# Patient Record
Sex: MALE | Employment: UNEMPLOYED | URBAN - METROPOLITAN AREA
[De-identification: names, ages, dates, MRNs, and addresses within clinical notes are randomized per-mention and may not be internally consistent; named-entity substitution may affect disease eponyms.]

---
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
## 2022-04-03 ENCOUNTER — HOSPITAL ENCOUNTER (EMERGENCY)
Facility: CLINIC | Age: 22
Discharge: HOME | End: 2022-04-03
Attending: EMERGENCY MEDICINE | Admitting: EMERGENCY MEDICINE
Payer: COMMERCIAL

## 2022-04-03 VITALS
OXYGEN SATURATION: 97 % | TEMPERATURE: 98.2 F | DIASTOLIC BLOOD PRESSURE: 72 MMHG | HEART RATE: 67 BPM | BODY MASS INDEX: 25.9 KG/M2 | SYSTOLIC BLOOD PRESSURE: 123 MMHG | WEIGHT: 185 LBS | HEIGHT: 71 IN | RESPIRATION RATE: 18 BRPM

## 2022-04-03 DIAGNOSIS — F32.A DEPRESSION, UNSPECIFIED DEPRESSION TYPE: ICD-10-CM

## 2022-04-03 DIAGNOSIS — R45.851 PASSIVE SUICIDAL IDEATIONS: Primary | ICD-10-CM

## 2022-04-03 LAB
AMPHETAMINES UR QL SCN: NORMAL
BARBITURATES UR QL: NORMAL
BENZODIAZ UR QL: NORMAL
CANNABINOIDS UR QL SCN: NORMAL
COCAINE UR QL: NORMAL
OPIATES UR QL SCN: NORMAL

## 2022-04-03 PROCEDURE — 99285 EMERGENCY DEPT VISIT HI MDM: CPT | Mod: 25 | Performed by: EMERGENCY MEDICINE

## 2022-04-03 PROCEDURE — 99285 EMERGENCY DEPT VISIT HI MDM: CPT | Performed by: EMERGENCY MEDICINE

## 2022-04-03 PROCEDURE — 80307 DRUG TEST PRSMV CHEM ANLYZR: CPT | Performed by: EMERGENCY MEDICINE

## 2022-04-03 PROCEDURE — 90791 PSYCH DIAGNOSTIC EVALUATION: CPT

## 2022-04-03 ASSESSMENT — ENCOUNTER SYMPTOMS
DYSPHORIC MOOD: 1
HYPERACTIVE: 0
COLOR CHANGE: 0
HEMATURIA: 0
DYSURIA: 0
FREQUENCY: 0
DIARRHEA: 0
APPETITE CHANGE: 0
NUMBNESS: 0
PALPITATIONS: 0
AGITATION: 0
ABDOMINAL PAIN: 0
WOUND: 0
NAUSEA: 0
COUGH: 0
CONSTIPATION: 0
SHORTNESS OF BREATH: 0
NECK PAIN: 0
WEAKNESS: 0
FEVER: 0
CHILLS: 0
BACK PAIN: 0
LIGHT-HEADEDNESS: 0
HEADACHES: 0
RHINORRHEA: 0
FLANK PAIN: 0
BRUISES/BLEEDS EASILY: 0
DECREASED CONCENTRATION: 0
HALLUCINATIONS: 0
VOMITING: 0
CONFUSION: 0
FATIGUE: 0
NERVOUS/ANXIOUS: 1
SLEEP DISTURBANCE: 1

## 2022-04-03 NOTE — ED PROVIDER NOTES
"ED Provider Note  [facility]      History     Chief Complaint   Patient presents with     Suicidal     thoughts only     HPI  Paolo Anglin is a 21 year old male who has no significant past medical history.  He reports that he broke up with his significant other on March 20 and since that time has been feeling depressed and anxious, has had thoughts of wanting to harm himself.  He is not forthcoming in regards to any plans that he has had, but he does note that \"I would never actually do it.\"  No hallucinations or thoughts of wanting to harm others.  No history of psychiatric hospitalizations or suicide attempts in the past.  Denies alcohol abuse, denies drug use.  Denies any physical concerns at this time.  The patient denies any other psychiatric concerns today.     Past Medical History  History reviewed. No pertinent past medical history.  History reviewed. No pertinent surgical history.  loratadine (CLARITIN) 10 MG tablet      No Known Allergies  Family History  History reviewed. No pertinent family history.  Social History   Social History     Tobacco Use     Smoking status: Never Smoker     Smokeless tobacco: Never Used   Substance Use Topics     Alcohol use: No     Drug use: No      Past medical history, past surgical history, medications, allergies, family history, and social history were reviewed with the patient. No additional pertinent items.       Review of Systems   Constitutional: Negative for appetite change, chills, fatigue and fever.   HENT: Negative for congestion, ear pain and rhinorrhea.    Eyes: Negative for visual disturbance.   Respiratory: Negative for cough and shortness of breath.    Cardiovascular: Negative for chest pain and palpitations.   Gastrointestinal: Negative for abdominal pain, constipation, diarrhea, nausea and vomiting.   Genitourinary: Negative for dysuria, flank pain, frequency, hematuria and urgency.   Musculoskeletal: Negative for back pain " "and neck pain.   Skin: Negative for color change, rash and wound.   Allergic/Immunologic: Negative for immunocompromised state.   Neurological: Negative for syncope, weakness, light-headedness, numbness and headaches.   Hematological: Does not bruise/bleed easily.   Psychiatric/Behavioral: Positive for dysphoric mood, sleep disturbance and suicidal ideas. Negative for agitation, behavioral problems, confusion, decreased concentration, hallucinations and self-injury. The patient is nervous/anxious. The patient is not hyperactive.    All other systems reviewed and are negative.    A complete review of systems was performed with pertinent positives and negatives noted in the HPI, and all other systems negative.    Physical Exam   BP: 96/53  Pulse: 75  Temp: 98.2  F (36.8  C)  Resp: 18  Height: 180.3 cm (5' 11\")  Weight: 83.9 kg (185 lb)     Physical Exam  Vitals and nursing note reviewed.   Constitutional:       General: He is not in acute distress.     Appearance: He is not ill-appearing, toxic-appearing or diaphoretic.   HENT:      Head: Normocephalic and atraumatic.      Right Ear: External ear normal.      Left Ear: External ear normal.      Nose: Nose normal.      Mouth/Throat:      Mouth: Mucous membranes are moist.   Eyes:      Extraocular Movements: Extraocular movements intact.      Conjunctiva/sclera: Conjunctivae normal.      Pupils: Pupils are equal, round, and reactive to light.   Cardiovascular:      Rate and Rhythm: Normal rate and regular rhythm.      Pulses: Normal pulses.      Heart sounds: Normal heart sounds. No murmur heard.    No friction rub. No gallop.   Pulmonary:      Effort: Pulmonary effort is normal. No respiratory distress.      Breath sounds: Normal breath sounds. No stridor. No wheezing, rhonchi or rales.   Abdominal:      General: Bowel sounds are normal. There is no distension.      Tenderness: There is no abdominal tenderness. There is no right CVA tenderness, left CVA tenderness, " guarding or rebound.   Musculoskeletal:         General: Normal range of motion.      Cervical back: Normal range of motion and neck supple. No rigidity.      Right lower leg: No edema.      Left lower leg: No edema.   Skin:     General: Skin is warm.      Capillary Refill: Capillary refill takes less than 2 seconds.   Neurological:      General: No focal deficit present.      Mental Status: He is alert.   Psychiatric:      Comments: Tearful, slightly withdrawn.  Denies active suicidal ideation.  No active hallucinations on exam.         ED Course     ED Course as of 04/04/22 0138   Sun Apr 03, 2022 2012 Urine Drugs of Abuse Screen  Negative    2219 Per DEC , the patient denies any active SI and is comfortable rick for safety. He will go to [facility] in the morning to follow up and given strict return precautions.      Procedures: none.        The medical record was reviewed and interpreted.  Current labs reviewed and interpreted.  Mental Health Risk Assessment      PSS-3    Date and Time Over the past 2 weeks have you felt down, depressed, or hopeless? Over the past 2 weeks have you had thoughts of killing yourself? Have you ever attempted to kill yourself? When did this last happen? User   04/03/22 1809 yes yes no -- AAB      C-SSRS ([place])    Date and Time Q1 Wished to be Dead (Past Month) Q2 Suicidal Thoughts (Past Month) Q3 Suicidal Thought Method Q4 Suicidal Intent without Specific Plan Q5 Suicide Intent with Specific Plan Q6 Suicide Behavior (Lifetime) Within the Past 3 Months? RETIRED: Level of Risk per Screen Screening Not Complete User   04/03/22 1809 yes yes yes no no no -- -- -- AAB              Suicide assessment completed by mental health (D.E.C., LCSW, etc.)      Assessments & Plan (with Medical Decision Making)   Nursing notes and vital signs reviewed.     Presentation, exam, and workup is most consistent with depression, suicidal ideation without active plan.    Please see  "HPI, ROS, exam, and ED course above for pertinent history and findings. In short, the patient presented to the ED for evaluation of depression and passive suicidal ideation after breaking up with his significant other last week.  No history of self-harm, no history of psychiatric admission.  The patient spoke with one of our mental health providers, given the patient is only had passive suicidal ideation without active plan and several times states \"I would never actually go through with this,\" he is amenable to following up in [facility] tomorrow and also given strict return precautions.  Was able to contract for safety.  Again, no indication for inpatient evaluation/treatment at this time.    On re-evaluation, the patient is resting comfortably. I discussed with the patient the results of the above studies. All questions were answered prior to discharge, and the patient was told to follow up with [facility] per discharge instructions. Reasons for return as well as follow up were reviewed with the patient.  The patient expressed understanding and agreement.    Disposition: The patient was discharged to home in stable condition.      Final diagnoses:   Depression, unspecified depression type   Passive suicidal ideations       --  Janine Huffman MD   [facility] EMERGENCY DEPARTMENT  4/3/2022     Janine Huffman MD  04/04/22 0141    "

## 2022-04-03 NOTE — ED NOTES
Pt presents to the by himself looking for some help. Pt states he doesn't feel safe going home. Pt has been thinking about suicide since 3/21/22, no plan, thoughts only. Pt denies any homicidal ideations. Pt lives with dad. Denies any daily medications.

## 2022-04-04 NOTE — ED NOTES
4/3/2022  Paolo Anglin 2000     [facility] Crisis Assessment    Patient was assessed: remote  Patient location: [facility]    Referral Data and Chief Complaint  Patient  is a 21 year old who uses he/his/him pronouns. Patient presented to the ED alone and was referred to the ED by self. Patient is presenting to the ED for the following concerns: Depression stemming from broken relationship.  Patient broke up with his girl friend since 3/20/22.  During this assessment patient denied SI/HI/AH/VH.  Patient has no prior history of mental health hospitalization.  Patient denied using any drugs or alcohol.      Informed Consent and Assessment Methods    Patient is his own guardian. Writer met with patient and explained the crisis assessment process, including applicable information disclosures and limits to confidentiality, assessed understanding of the process, and obtained consent to proceed with the assessment. Patient was observed to be able to participate in the assessment as evidenced by his responses to interview questions.. Assessment methods included conducting a formal interview with patient, review of medical records, collaboration with medical staff, and obtaining relevant collateral information from family and community providers when available.    Narrative Summary of Presenting Problem and Current Functioning  What led to the patient presenting for crisis services, factors that make the crisis life threatening or complex, stressors, how is this disrupting the patient's life, and how current functioning is in comparison to baseline. How is patient presenting during the assessment.     Patient is presenting to the ED for the following concerns: Depression stemming from broken relationship.  Patient broke up with his girl friend since 3/20/22.  During this assessment patient denied SI/HI/AH/VH.  Patient has no prior history of mental health hospitalization.  Patient denied using any drugs or  alcohol.  Patient is not on any medication.      History of the Crisis  Duration of the current crisis, coping skills attempted to reduce the crisis, community resources used, and past presentations.    Patient reported that he broke up with his long-term girlfriend since 3/20/22.  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    Collateral Information  Medical record and epic    Risk Assessment    Risk of Harm to Self     ESS-6  1.a. Over the past 2 weeks, have you had thoughts of killing yourself? Yes  1.b. Have you ever attempted to kill yourself and, if yes, when did this last happen? No   2. Recent or current suicide plan? No   3. Recent or current intent to act on ideation? No  4. Lifetime psychiatric hospitalization? No  5. Pattern of excessive substance use? No  6. Current irritability, agitation, or aggression? No  Scoring note: BOTH 1a and 1b must be yes for it to score 1 point, if both are not yes it is zero. All others are 1 point per number. If all questions 1a/1b - 6 are no, risk is negligible. If one of 1a/1b is yes, then risk is mild. If either question 2 or 3, but not both, is yes, then risk is automatically moderate regardless of total score. If both 2 and 3 are yes, risk is automatically high regardless of total score.     Score: 0, mild risk    The patient has the following risk factors for suicide: poor decision making    Is the patient experiencing current suicidal ideation: No    Is the patient engaging in preparatory suicide behaviors (formulating how to act on plan, giving away possessions, saying goodbye, displaying dramatic behavior changes, etc)? No    Does the patient have access to firearms or other lethal means? 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 no    Is the patient vulnerable to sexual exploitation?  No    Is the patient experiencing abuse or neglect? no    Is the patient a vulnerable adult? No      Risk of Harm to Others  The patient has the following risk factors of harm to others: no risk factors identified    Does the patient have thoughts of harming others? No    Is the patient engaging in sexually inappropriate behavior?  no       Current Substance Abuse    Is there recent substance abuse? no    Was a urine drug screen or blood alcohol level obtained: No    CAGE AID  Have you felt you ought to cut down on your drinking or drug use?  No  Have people annoyed you by criticizing your drinking or drug use? No  Have you felt bad or guilty about your drinking or drug use? No  Have you ever had a drink or used drugs first thing in the morning to steady your nerves or to get rid of a hangover? No  Score: 0/4       Current Symptoms/Concerns    Symptoms  Attention, hyperactivity, and impulsivity symptoms present: No    Anxiety symptoms present: No      Appetite symptoms present: Yes: Loss of Appetite     Behavioral difficulties present: No     Cognitive impairment symptoms present: No    Depressive symptoms present: Yes Feelings of helplessness      Eating disorder symptoms present: No    Learning disabilities, cognitive challenges, and/or developmental disorder symptoms present: No     Manic/hypomanic symptoms present: No    Personality and interpersonal functioning difficulties present : No    Psychosis symptoms present: No      Sleep difficulties present: Yes: Difficulty falling asleep     Substance abuse disorder symptoms present: No     Trauma and stressor related symptoms present: Yes: Intrusions: Flashbacks           Mental Status Exam   Affect: Flat   Appearance: Appropriate    Attention Span/Concentration: Attentive?    Eye Contact: Engaged    Fund of Knowledge: Appropriate    Language /Speech Content: Fluent   Language /Speech Volume: Normal    Language /Speech Rate/Productions: Normal    Recent Memory: Intact   Remote Memory: Intact   Mood: Sad    Orientation to Person: Yes    Orientation to Place: Yes   Orientation to Time of Day: Yes    Orientation to Date: Yes    Situation (Do they understand why they are here?): Yes    Psychomotor Behavior: Normal    Thought Content: Clear   Thought Form: Goal Directed       Mental Health and Substance Abuse History    History  Current and historical diagnoses or mental health concerns: Depression    Prior MH services (inpatient, programmatic care, outpatient, etc) : No    Has the patient used [facility] crisis team services before?: No    History of substance abuse: No    Prior L[facility]ERNE services (inpatient, programmatic care, detox, outpatient, etc) : No    History of commitment: No    Family history of MH/LAVERNE: No    Trauma history: Yes patient's parents are     Medication  Psychotropic medications: No    Current Care Team  Primary Care Provider: No    Psychiatrist: No    Therapist: No    : No    CTSS or ARMHS: No    ACT Team: No    Other: No    Release of Information  Was a release of information signed: No. Reason: patient was seen virtually      Biopsychosocial Information    Socioeconomic Information  Current living situation: Patient lives with his dad    Employment/income source: yes    Relevant legal issues: no    Cultural, Orthodoxy, or spiritual influences on mental health care: no    Is the patient active in the  or a : No      Relevant Medical Concerns   Patient identifies concerns with completing ADLs? No     Patient can ambulate independently? Yes     Other medical concerns? No     History of concussion or TBI? No        Diagnosis    296.31 (F33.0) Major Depressive Disorder, Recurrent Episode, Mild _ and With anxious distress - provisional      309.21 (F93.0) Separation  Anxiety Disorder - by history     300.02 (F41.1) Generalized Anxiety Disorder - rule out       Therapeutic Intervention  The following therapeutic methodologies were employed when working with the patient: establishing rapport. Patient response to intervention: Patient understood that he could benefit from individual therapy.      Disposition  Recommended disposition: Individual Therapy      Reviewed case and recommendations with attending provider. Attending Name: Dr. Huffman      Attending concurs with disposition: Yes      Patient concurs with disposition: Yes      Guardian concurs with disposition: NA     Final disposition: Individual therapy .     Inpatient Details (if applicable):  Is patient admitted voluntarily:NA    Patient aware of potential for transfer if there is not appropriate placement? NA     Patient is willing to travel outside of the [facility] for placement? NA      Behavioral Intake Notified? NA       Clinical Substantiation of Recommendations   Rationale with supporting factors for disposition and diagnosis.     Patient presented to the ED for mental health evaluation.  Patient broke up with his long -time girlfriend.  Patient is sad and depressed with anxiety.  Patient is referred to individual therapy at [facility].  Patient is a student at the [employer].       Assessment Details  Patient interview started at: 9:15 pm and completed at: 10:15 pm.    Total duration spent on the patient case in minutes: 1.0 hrs     CPT code(s) utilized: 26112 - Psychotherapy for Crisis - 60 (30-74*) min       Aftercare and Safety Planning  Follow up plans with MH/LAVERNE services: No      Aftercare plan placed in the AVS and provided to patient: Yes. Given to patient by ED staff    DELLA Ku      Additional Information  Today you were seen by a licensed mental health professional through Triage and Transition services, [facility]  for a crisis assessment in the Emergency Department at  [facility].  It is recommended that you follow up with your established providers (psychiatrist, mental health therapist, and/or primary care doctor - as relevant) as soon as possible. Coordinators from [facility] will be calling you in the next 24-48 hours to ensure that you have the resources you need.  You can also contact [facility] coordinators directly at [phone]. You may have been scheduled for or offered an appointment with a mental health provider. [facility] maintains an extensive network of licensed behavioral health providers to connect patients with the services they need.  We do not charge providers a fee to participate in our referral network.  We match patients with providers based on a patient's specific needs, insurance coverage, and location.  Our first effort will be to refer you to a provider within your care system, and will utilize providers outside your care system as needed.

## 2022-04-04 NOTE — DISCHARGE INSTRUCTIONS
"Aftercare Plan  If I am feeling unsafe or I am in a crisis, I will:   Contact my established care providers   Call the National Suicide Prevention Lifeline: 201.450.8512   Go to the nearest emergency room   Call 911     Warning signs that I or other people might notice when a crisis is developing for me: Feeling lonely     Things I am able to do on my own to cope or help me feel better: talk to friends     Things that I am able to do with others to cope or help me better: active sports and job functions     Things I can use or do for distraction: talk to friends and think of school work     Changes I can make to support my mental health and wellness: see a therapist at [facility]     People in my life that I can ask for help: parents and siblings     Your [facility] has a mental health crisis team you can call 24/7: [facility] [place] [facility]  [phone] (adults)  [phone] (children)    Other things that are important when I'm in crisis: think of future career     Additional resources and information:   Go to [address] [place], [place] [id number]  Hours:   Closed ? Opens 7:45AM Mon OR call to get in-person therapeutic appointment  Phone: [phone]      Crisis Lines  Crisis Text Line  Text 824041  You will be connected with a trained live crisis counselor to provide support.    Por espanol, texto  FRANCISCO JAVIER a 613804 o texto a 442-AYUDAME en WhatsA    [employer] (LGBTQ Youth Crisis Line)  7.498.242.5765  text START to 830-826      Community Resources  Fast Tracker  Linking people to mental health and substance use disorder resources  fasttrackInteKrinn.org     [place] Mental Health Warm Line  Peer to peer support  Monday thru Saturday, 12 pm to 10 pm  204.878.5440 or 7.038.297.6148  Text \"Support\" to 89345    [employer] Mental [employer]ness (WILMER)  055.725.0985 or 1.888.WILMER.HELPS      Mental Health Apps  My3  https://my3app.org/    VirtualHopeBox  " https://[employer].org/apps/virtual-hope-box/

## 2022-06-20 ENCOUNTER — TELEPHONE (OUTPATIENT)
Dept: BEHAVIORAL HEALTH | Facility: CLINIC | Age: 22
End: 2022-06-20
Payer: COMMERCIAL

## 2022-06-23 ENCOUNTER — TELEPHONE (OUTPATIENT)
Dept: BEHAVIORAL HEALTH | Facility: CLINIC | Age: 22
End: 2022-06-23

## 2022-06-27 ENCOUNTER — TELEPHONE (OUTPATIENT)
Dept: BEHAVIORAL HEALTH | Facility: CLINIC | Age: 22
End: 2022-06-27

## 2022-06-27 NOTE — TELEPHONE ENCOUNTER
A second voice message was left for patient to return call to check in for their virtual appointment today.

## 2022-06-27 NOTE — TELEPHONE ENCOUNTER
Patient has a virtual  eval today, 6/27/2022 at 1330. A phone call was made out to patient to check them in for this appointment, but no answer so a voice message was left for patient to return call.

## 2022-06-28 ENCOUNTER — HOSPITAL ENCOUNTER (OUTPATIENT)
Dept: BEHAVIORAL HEALTH | Facility: CLINIC | Age: 22
Discharge: HOME | End: 2022-06-28
Attending: FAMILY MEDICINE | Admitting: FAMILY MEDICINE
Payer: COMMERCIAL

## 2022-06-28 PROCEDURE — 90791 PSYCH DIAGNOSTIC EVALUATION: CPT | Mod: GT,95 | Performed by: PSYCHOLOGIST

## 2022-06-28 ASSESSMENT — ANXIETY QUESTIONNAIRES
6. BECOMING EASILY ANNOYED OR IRRITABLE: SEVERAL DAYS
1. FEELING NERVOUS, ANXIOUS, OR ON EDGE: MORE THAN HALF THE DAYS
7. FEELING AFRAID AS IF SOMETHING AWFUL MIGHT HAPPEN: MORE THAN HALF THE DAYS
4. TROUBLE RELAXING: NEARLY EVERY DAY
3. WORRYING TOO MUCH ABOUT DIFFERENT THINGS: NEARLY EVERY DAY
GAD7 TOTAL SCORE: 16
8. IF YOU CHECKED OFF ANY PROBLEMS, HOW DIFFICULT HAVE THESE MADE IT FOR YOU TO DO YOUR WORK, TAKE CARE OF THINGS AT HOME, OR GET ALONG WITH OTHER PEOPLE?: VERY DIFFICULT
7. FEELING AFRAID AS IF SOMETHING AWFUL MIGHT HAPPEN: MORE THAN HALF THE DAYS
5. BEING SO RESTLESS THAT IT IS HARD TO SIT STILL: MORE THAN HALF THE DAYS
2. NOT BEING ABLE TO STOP OR CONTROL WORRYING: NEARLY EVERY DAY
GAD7 TOTAL SCORE: 16
GAD7 TOTAL SCORE: 16

## 2022-06-28 ASSESSMENT — PATIENT HEALTH QUESTIONNAIRE - PHQ9
SUM OF ALL RESPONSES TO PHQ QUESTIONS 1-9: 16
SUM OF ALL RESPONSES TO PHQ QUESTIONS 1-9: 16
10. IF YOU CHECKED OFF ANY PROBLEMS, HOW DIFFICULT HAVE THESE PROBLEMS MADE IT FOR YOU TO DO YOUR WORK, TAKE CARE OF THINGS AT HOME, OR GET ALONG WITH OTHER PEOPLE: VERY DIFFICULT

## 2022-06-28 ASSESSMENT — COLUMBIA-SUICIDE SEVERITY RATING SCALE - C-SSRS
3. HAVE YOU BEEN THINKING ABOUT HOW YOU MIGHT KILL YOURSELF?: YES
1. IN THE PAST MONTH, HAVE YOU WISHED YOU WERE DEAD OR WISHED YOU COULD GO TO SLEEP AND NOT WAKE UP?: YES
6. HAVE YOU EVER DONE ANYTHING, STARTED TO DO ANYTHING, OR PREPARED TO DO ANYTHING TO END YOUR LIFE?: NO
5. HAVE YOU STARTED TO WORK OUT OR WORKED OUT THE DETAILS OF HOW TO KILL YOURSELF? DO YOU INTEND TO CARRY OUT THIS PLAN?: NO
2. HAVE YOU ACTUALLY HAD ANY THOUGHTS OF KILLING YOURSELF IN THE PAST MONTH?: YES
4. HAVE YOU HAD THESE THOUGHTS AND HAD SOME INTENTION OF ACTING ON THEM?: NO

## 2022-06-28 NOTE — PROGRESS NOTES
"    [facility]  Provider Name:  Kat Galvez MA LP        PATIENT'S NAME: Paolo Anglin  PREFERRED NAME: Paolo  PRONOUNS:     little him  MRN: [id number]  : 2000  ADDRESS: [address] [place] [id number]  ACCT. NUMBER:  [id number]  DATE OF SERVICE: 22  START TIME: 1335  END TIME:  1450  PREFERRED PHONE: [phone]  May we leave a program related message: Yes  SERVICE MODALITY:  Video Visit:      Provider verified identity through the following two step process.  Patient provided:  Patient  and Patient address    Telemedicine Visit: The patient's condition can be safely assessed and treated via synchronous audio and visual telemedicine encounter.      Reason for Telemedicine Visit: Services only offered telehealth    Originating Site (Patient Location): Patient's home    Distant Site (Provider Location): [facility] MENT[facility] HE[facility]DI[facility]    Consent:  The patient/guardian has verbally consented to: the potential risks and benefits of telemedicine (video visit) versus in person care; bill my insurance or make self-payment for services provided; and responsibility for payment of non-covered services.     Patient would like the video invitation sent by:  My Chart    Mode of Communication:  Video Conference via Amwell    As the provider I attest to compliance with applicable laws and regulations related to telemedicine.    UNIVERSAL ADULT Mental Health DIAGNOSTIC ASSESSMENT    Identifying Information:  Patient is a 22 year old,  male. The pronoun use throughout this assessment reflects the patient's chosen pronoun.  Patient was referred for an assessment by Greg.  Patient attended the session alone.    Chief Complaint:   The reason for seeking services at this time is: \"Therapy\".  The problem(s) began 3/20/2022.    Patient has attempted to resolve these concerns in the past through " "[facility], [place].  Pt has an appointment tomorrow.  He only recently began to work with a therapist.  Pt reports no hx of mental health problems or treatment until recently when he was referred to [facility] by the ED where he self presented due to depression and SI secondary to a recent relationship break up. Pt reports he dated his girlfriend for four years and that he initiated a break up in March after he realized he would be very busy this summer working and doing summer classes.  He is a senior.  His girlfriend just graduated.  She responded by asking for distance then concurred that it was a good idea to end the relationship.  Pt regretted his decision but ex partner has since 'moved on' and is dating someone else.  Pt describes adjustment issues.  He has never had depression prior to the break up.       Social/Family History:  Patient reported they grew up in [facility] . They were raised by biological parents.  Parents  / .  Patient reported that their childhood was I'm the oldest, lots of responsibilities.  Patient described their current relationships with family of origin as pt reports he has talked to his mom more, parents are empathetic, tell him to \"just move on.\"     The patient describes their cultural background as .  Cultural influences and impact on patient's life structure, values, norms, and healthcare: None that I can think of.  Contextual influences on patient's health include: Individual Factors end of a long term relationship and full academic schedule.    These factors will be addressed in the Preliminary Treatment plan. Patient identified their preferred language to be English. Patient reported they do not need the assistance of an  or other support involved in therapy.     Patient reported had no significant delays in developmental tasks.   Patient's highest education level was associate degree / vocational certificate and he will be a senior " at the [employer] next fall.  Patient identified the following learning problems: none reported.  Modifications will not be used to assist communication in therapy.  Patient reports they are  able to understand written materials.    Patient reported the following relationship history:  Patient's current relationship status is single since March 20th.  Patient identified their sexual orientation as heterosexual.  Patient reported having   0 child(mary). Patient identified parents as part of their support system.  Patient identified the quality of these relationships as inconsistent .     Patient's current living/housing situation involves staying with someone.  The immediate members of family and household include Chris Anglin, 50,Father  and they report that housing is stable.    Patient is currently employed fulltime.  Patient reports their finances are obtained through employment. Patient does identify finances as a current stressor.      Patient reported that they have not been involved with the legal system.  Patient does not report being under probation/ parole/ jurisdiction. They are not under any current court jurisdiction. .    Patient's Strengths and Limitations:  Patient identified the following strengths or resources that will help them succeed in treatment: commitment to health and well being, insight, intelligence and motivation. Things that may interfere with the patient's success in treatment include: none identified.     Assessments:  The following assessments were completed by patient for this visit:  PHQ9:   PHQ-9 SCORE 6/28/2022   PHQ-9 Total Score MyChart 16 (Moderately severe depression)   PHQ-9 Total Score 16     GAD7:   KIRSTIN-7 SCORE 6/28/2022   Total Score 16 (severe anxiety)   Total Score 16     CAGE-AID:   CAGE-AID Total Score 6/28/2022   Total Score 0   Total Score MyChart 0 (A total score of 2 or greater is considered clinically significant)     PROMIS 10-Global Health (all questions and answers  displayed):   PROMIS 10 6/28/2022   In general, would you say your health is: Very good   In general, would you say your quality of life is: Good   In general, how would you rate your physical health? Very good   In general, how would you rate your mental health, including your mood and your ability to think? Good   In general, how would you rate your satisfaction with your social activities and relationships? Good   In general, please rate how well you carry out your usual social activities and roles Good   To what extent are you able to carry out your everyday physical activities such as walking, climbing stairs, carrying groceries, or moving a chair? Completely   How often have you been bothered by emotional problems such as feeling anxious, depressed or irritable? Always   How would you rate your fatigue on average? Mild   How would you rate your pain on average?   0 = No Pain  to  10 = Worst Imaginable Pain 0   In general, would you say your health is: 4   In general, would you say your quality of life is: 3   In general, how would you rate your physical health? 4   In general, how would you rate your mental health, including your mood and your ability to think? 3   In general, how would you rate your satisfaction with your social activities and relationships? 3   In general, please rate how well you carry out your usual social activities and roles. (This includes activities at home, at work and in your community, and responsibilities as a parent, child, spouse, employee, friend, etc.) 3   To what extent are you able to carry out your everyday physical activities such as walking, climbing stairs, carrying groceries, or moving a chair? 5   In the past 7 days, how often have you been bothered by emotional problems such as feeling anxious, depressed, or irritable? 5   In the past 7 days, how would you rate your fatigue on average? 2   In the past 7 days, how would you rate your pain on average, where 0 means no  pain, and 10 means worst imaginable pain? 0   Global Mental Health Score 10   Global Physical Health Score 18   PROMIS TOTAL - SUBSCORES 28   Some recent data might be hidden     [place] Suicide Severity Rating Scale (Lifetime/Recent)  [place] Suicide Severity Rating (Lifetime/Recent) 4/3/2022 4/3/2022 6/28/2022   Q1 Wished to be Dead (Past Month) yes yes yes   Q2 Suicidal Thoughts (Past Month) yes yes yes   Q3 Suicidal Thought Method yes yes yes   Q4 Suicidal Intent without Specific Plan no no no   Q5 Suicide Intent with Specific Plan no no no   Q6 Suicide Behavior (Lifetime) no no no   Level of Risk per Screen moderate risk moderate risk moderate risk       Personal and Family Medical History:  Patient does not report a family history of mental health concerns.  Patient reports family history is not on file.  Pt reports no family hx of diagnosed mental illness but thinks parents have hx of depression.    Patient does report Mental Health Diagnosis and/or Treatment.  Patient Patient reported the following previous diagnoses which include(s):   .  Patient reported symptoms began March of this year after a break up.  Patient has not received mental health services in the past. Psychiatric Hospitalizations: none.   Patient denies a history of civil commitment.  Currently, patient is receiving other mental health services since his ED visit.  These include psychotherapy with KIRSTY Barron.         Patient has had a physical exam to rule out medical causes for current symptoms.  Date of last physical exam was within the past year. Client was encouraged to follow up with PCP if symptoms were to develop. The patient has a [place] Primary Care Provider, who is named Clinic[facility]..  Patient reports no current medical concerns.  Patient denies any issues with pain..   There are not significant appetite / nutritional concerns / weight changes.   Patient does not report a history of head injury /  trauma / cognitive impairment.      Patient reports current meds as:   Outpatient Medications Marked as Taking for the 6/28/22 encounter (Hospital Encounter) with Kat Galvez, [facility]   Medication Sig     sertraline (ZOLOFT) 50 MG tablet Take 50 mg by mouth daily       Medication Adherence:  Patient reports taking.  taking prescribed medications as prescribed.    Patient Allergies:  No Known Allergies    Medical History:  No past medical history on file.      Current Mental Status Exam:   Appearance:  Appropriate    Eye Contact:  Good   Psychomotor:  Normal       Gait / station:  Unable to assess  Attitude / Demeanor: Cooperative  Interested Pleasant  Speech      Rate / Production: Normal/ Responsive      Volume:  Normal  volume      Language:  intact  Mood:   Normal  Affect:   Appropriate    Thought Content: Clear   Thought Process: Goal Directed  Logical       Associations: No loosening of associations  Insight:   Intellectual Insight  Judgment:  Intact   Orientation:  All  Attention/concentration: Good      Substance Use:  Patient did not report a family history of substance use concerns; see medical history section for details.  Patient has not received chemical dependency treatment in the past.  Patient has not ever been to detox.      Patient is not currently receiving any chemical dependency treatment. Patient reported the following problems as a result of their substance use:  none per pt.    Patient reports using alcohol 2 times per week and has 2 beers at a time. Pt reports he is a 'social drinker' and has never had a problem with alcohol.    Patient denies using tobacco.  Patient reports very occasional use of marijuana, last use three weeks ago when he had a hit.  Patient reports using/abusing the following substance(s). Patient reported no other substance use.     Substance Use: No symptoms    Based on the negative CAGE score and clinical interview there  are not indications of drug or  alcohol abuse.      Significant Losses / Trauma / Abuse / Neglect Issues:   Patient did not  serve in the .  There are indications or report of significant loss, trauma, abuse or neglect issues related to: are no indications and client denies any losses, trauma, abuse, or neglect concerns.  Concerns for possible neglect are not present.     Safety Assessment:   Patient denies current homicidal ideation and behaviors.  Patient denies current self-injurious ideation and behaviors.    Patient denied risk behaviors associated with substance use.  Patient denies any high risk behaviors associated with mental health symptoms.  Patient reports the following current concerns for their personal safety: None.  Patient reports there are not firearms in the house.        History of Safety Concerns:  Patient denied a history of homicidal ideation.     Patient denied a history of personal safety concerns.    Patient denied a history of assaultive behaviors.    Patient denied a history of sexual assault behaviors.     Patient denied a history of risk behaviors associated with substance use.  Patient denies any history of high risk behaviors associated with mental health symptoms.  Patient reports the following protective factors: safe and stable environment    Risk Plan:  See Recommendations for Safety and Risk Management Plan    Review of Symptoms per patient report:  Depression: Change in sleep, Lack of interest, Change in energy level, Suicidal ideation, Feeling sad, down, or depressed and pt reports the SI is fleeting and without intent or plan.    [place]:  No Symptoms  Psychosis: No Symptoms  Anxiety: Excessive worry, Nervousness, Poor concentration and all anxiety sxs are secondary to the end of the relationship  Panic:  Palpitations, Sense of impending doom and through April and May, had panic attacks after the breakup but none currently.  Lats one was three weeks ago.  Post Traumatic Stress Disorder:  No Symptoms    Eating Disorder: No Symptoms  ADD / ADHD:  No symptoms  Conduct Disorder: No symptoms  Autism Spectrum Disorder: No symptoms  Obsessive Compulsive Disorder: No Symptoms    Patient reports the following compulsive behaviors and treatment history: none    Diagnostic Criteria:   Adjustment Disorder  A. The development of emotional or behavioral symptoms in response to an identifiable stressor(s) occurring within 3 months of the onset of the stressor(s)  B. These symptoms or behaviors are clinically significant, as evidenced by one or both of the following:       - Marked distress that is out of proportion to the severity/intensity of the stressor (with consideration for external context & culture)  C. The stress-related disturbance does not meet criteria for another disorder & is not not an exacerbation of another mental disorder  D. The symptoms do not represent normal bereavement  E. Once the stressor or its consequences have terminated, the symptoms do not persist for more than an additional 6 months       * Adjustment Disorder with Mixed Anxiety and Depressed Mood: The predominant manifestation is a combination of depression and anxiety    Functional Status:  Patient reports the following functional impairments:  money management, relationship(s), self-care and social interactions.         Clinical Summary and Substantiation for LOC recommendations:      Pt is a 22 year old male who presents to this assessment having been referred by [facility] Tobar, the [facility] Mental health department.  Pt reports that he has been adjusting to the loss of a four year relationship.  Shortly after the break up at the end of March beginning of April, pt self referred to the ED due to suicidal thoughts.  He denies any current SI and denies he has ever tried to take his life. This was the first time he had SI.  He denies SIB.  Pt denies regular use of alcohol or other substances, stating he drinks maybe twice a month at most and  not to intoxication.  Pt reports he has improved in mood since his ED presentation and that he began counseling at the [facility] after his discharge with a planned follow up  appointment tomorrow. Pt reports he was prescribed sertraline through [facility] as well, three weeks ago.  He has had one dose increase and he has found this to be helpful.  Pt reports he has a follow up appointment for his medications this Thursday, provider name unavailable.  He also later reported he has hydroxyzine PRN but has not needed to use this very often for sleep.  He reports he has a full academic load this semester and has been living with his father in [place] and will continue to through next year until he graduates.  Pt suggests his mood has improved since his initial presentation and he has a restoration of hope.  He is still finding himself quite sad at times but suggests he has made good use of therapy and plans to remain in therapy throughout this year if needed. Pt has no prior mental health hx and has never required an inpatient admission.  He is working a few part time jobs that can total 20 hours a week and likes this as he finds the structure to be therapeutic.  Pt was most likely being referred for IOP or day treatment but he has no time in his schedule and he has found that OP has been effective.  He, however, is not sure if his current therapist is an intern or can provide long term therapy.  For that reason, an appointment has been set for pt with a therapist off campus.  Pt was also encouraged to ask his [facility] therapist about on campus support groups.  Pt does not appear to be at risk at this time given willingness to seek support, has demonstrated he can reach out for help and has no evidence of cognitive disturbance or cognitive impairment.  There is no evidence of psychosis.  Pt appears to be in an adjustment period and is struggling with the loss of his long term relationship.     1. Reason for assessment:  see substantiation above.   2. Psychosocial, Cultural and Contextual Factors: pt had first long term relationship break up  .  3. Principal DSM5 Diagnoses  (Sustained by DSM5 Criteria Listed Above):   Adjustment Disorders  309.28 (F43.23) With mixed anxiety and depressed mood.  4. Other Diagnoses that is relevant to services: none  5. Provisional Diagnosis:  none  6. Prognosis: Return to Normal Functioning.  7. Likely consequences of symptoms if not treated: less expedient recovery.  8. Client strengths include:  educated, employed, goal-focused, open to suggestions / feedback, support of family, friends and providers and willing to ask questions .     Recommendations:     1. Plan for Safety and Risk Management:   Recommended that patient call 911 or go to the local ED should there be a change in any of these risk factors.   A copy of the safety plan was sent to pt.          Report to child / adult protection services was NA.     2. Patient's identified none.     3. Initial Treatment will focus on:    Adjustment issues, grief     4. Resources/Service Plan:    services are not indicated.   Modifications to assist communication are not indicated.   Additional disability accommodations are not indicated.      5. Collaboration:   Collaboration / coordination of treatment will be initiated with the following support professionals: no RAMONA at the time of this assessment.      6.  Referrals:   The following referral(s) will be initiated: Outpatient Mental [facility]. Next Scheduled Appointment:     Date: Thursday, 7/7/2022  Time: 11:00 am - 12:00 pm  Provider: Radha Saucedo  Supervised by: Patrick RAMIREZ  LICSW  Location: [facility], [address] [place] [address]  Phone: [phone]  Type: Teletherapy.     A Release of Information has been obtained for the following: none at the time of this assessment.    7. LAVERNE:    LAVERNE:  Discussed the  "general effects of drugs and alcohol on health and well-being.       8. Records:   These were reviewed at time of assessment.   Information in this assessment was obtained from the medical record and provided by patient who is a good historian.  Patient will have open access to their mental health medical record.        Provider Name/ Credentials:  Kat Galvez MA LP    June 28, 2022            Outpatient Mental Health Services - Adult    MY COPING PLAN FOR SAFETY        Name: Paolo Anglin  YOB: 2000  Date: June 28, 2022   My primary care provider: [facility]  My prescriber: psychiatry through [address]  Other care team support:  Therapist at [facility] and campus crisis services.     My Triggers:  Relationship conflict which has since been addressed     Additional People, Places, and Things that I can access for support:   on [facility]: [facility]      If you are experiencing a crisis that requires immediate attention, call our clinic at [phone] during business hours to discuss options for care.    Crisis line counselors are available 24/7. Call [phone], or text \"[facility]\" to [phone].         What is important to me and makes life worth living: family.         GREEN    Good Control  1. I feel good  2. No suicidal thoughts   3. Can work, sleep and play      Action Steps  1. Self-care: balanced meals, exercising, sleep practices, etc.  2. Take your medications as prescribed.  3. Continue meetings with therapist and prescriber.  4.  Do the healthy things that I enjoy.             YELLOW  Getting Worse  I have ANY of these:  1. I do not feel good  2. Difficulty Concentrating  3. Sleep is changing  4. Increase/Change in my thoughts to hurt self and/or others, but I can still manage and not act on it.   5. Not taking care of self.               Action Steps (in addition to the above):  1. Inform " your therapist and psychiatric prescriber/PCP.  2. Keep taking your medications as prescribed.    3. Turn to people you can ask for help.  4. Use internal coping strategies -see below.  5. Create safe environment: notify friends/family of increase in symptoms             RED  Get Help  If I have ANY of these:  1. Current and uncontrollable thoughts and/or behaviors to hurt self and/or others.      Actions to manage my safety  1. Contact your emergency person   Marie Anglin (Mother)   [phone] (Home Phone)  2. Call my crisis team- [facility] [phone] [facility] Psych Emergencies  3. Or Call 911 or go to the emergency room right away              Safety Concerns  How To Identify Situations That Make Your Mental Health Worse:  Triggers are things that make your mental health worse.  Look at the list below to help you find your triggers and what you can do about them.     1. Identify Early Warning Signs:    Sometimes symptoms return, even when people do their best to stay well. Symptoms can develop over a short period of time with little or no warning, but most of the time they emerge gradually over several weeks.  Early warning signs are changes that people experience when a relapse is starting. Some early warning signs are common and others are not as common.   Common Early Warning Signs:    Feeling tense or nervous, Eating less or eating more, Trouble sleeping -either too much or too little sleep, Feeling depressed or low, Feeling irritable, Feeling like not being around other people, Trouble concentrating, Urges to harm self and Urges to use drugs or alcohol     2. Identify action steps to take when warning signs are noticed:    Taking Action- It is important to take action if you are experiencing early warning signs of a relapse.  The faster you act, the more likely it is that you can avoid a full relapse.  It is helpful to identify several specific ways to cope with symptoms.      The  following is my list of symptoms and coping strategies that I can use when they are present:    Symptom Coping Strategies   Anxiety -Talk with someone in your support system and let him or her know how you are feeling.  -Use relaxation techniques such as deep breathing or imagery.  -Use positive affirmations to counteract negative self-talk such as  I am learning to let go of worry.    Depression - Schedule your day; include activities you have to do and activities you enjoy doing.  - Get some exercise - walk, run, bike, or swim.  - Give yourself credit for even the smallest things you get done.   Sleep Difficulties   - Go to sleep at the same time every day.  - Do something relaxing before bed, such as drinking herbal tea or listening to music.  - Avoid having discussions about upsetting topics before going to bed.   Delusions   - Distract yourself from the disturbing thought by doing something that requires your attention such as a puzzle.  - Check out your beliefs by talking to someone you trust.    Hallucinations   - Use headphones to listen to music.  - Tell voices to  stop  or say to yourself,  I am safe.   - Ignore the hallucinations as much as possible; focus on other things.   Concentration Difficulties - Minimize distractions so there is only one thing for you to focus on at a time.    - Ask the person you are having a conversation with to slow down or repeat things you are unsure of.               Professionals or agencies I can contact during a crisis:      Suicide Prevention Lifeline: 5-617-470-TALK (4525)    Crisis Text Line Service (available 24 hours a day, 7 days a week): Text [place] to 867709    Call  **CRISIS ([phone]) from a cell phone to talk to a team of professionals who can help you.    Crisis Services By [facility]: Phone Number:   [place]     [phone]   [place]    [phone]   [facility]    [phone]   [place]    [phone]   [place]    [phone]   [place] [phone]    [place]     [phone]       Call 911 or go to my nearest emergency department.

## 2022-06-29 NOTE — PATIENT INSTRUCTIONS
"  Outpatient Mental Health Services - Adult     MY COPING PLAN FOR SAFETY           Name: Paolo Anglin  YOB: 2000  Date: June 28, 2022   My primary care provider: Holly Estevez [place]  My prescriber: psychiatry through [address]  Other care team support:  Therapist at [place] [facility] [place] [facility].      My Triggers:  Relationship conflict which has since been addressed       Additional People, Places, and Things that I can access for support:   on [facility]: [facility]      If you are experiencing a crisis that requires immediate attention, call our clinic at [phone] during business hours to discuss options for care.     Crisis line counselors are available 24/7. Call [phone], or text \"[facility]\" to [phone].            What is important to me and makes life worth living: family.            GREEN     Good Control  1. I feel good  2. No suicidal thoughts   3. Can work, sleep and play        Action Steps  1. Self-care: balanced meals, exercising, sleep practices, etc.  2. Take your medications as prescribed.  3. Continue meetings with therapist and prescriber.  4.  Do the healthy things that I enjoy.                YELLOW  Getting Worse  I have ANY of these:  1. I do not feel good  2. Difficulty Concentrating  3. Sleep is changing  4. Increase/Change in my thoughts to hurt self and/or others, but I can still manage and not act on it.   5. Not taking care of self.                Action Steps (in addition to the above):  1. Inform your therapist and psychiatric prescriber/PCP.  2. Keep taking your medications as prescribed.    3. Turn to people you can ask for help.  4. Use internal coping strategies -see below.  5. Create safe environment: notify friends/family of increase in symptoms                RED  Get Help  If I have ANY of these:  1. Current and uncontrollable thoughts and/or behaviors to hurt self " and/or others.        Actions to manage my safety  1. Contact your emergency person   Marie Anglin (Mother)   [phone] (Home Phone)  2. Call my crisis team- [facility] [phone] [facility]  3. Or Call 911 or go to the emergency room right away                  Safety Concerns  How To Identify Situations That Make Your Mental Health Worse:  Triggers are things that make your mental health worse.  Look at the list below to help you find your triggers and what you can do about them.      1. Identify Early Warning Signs:     Sometimes symptoms return, even when people do their best to stay well. Symptoms can develop over a short period of time with little or no warning, but most of the time they emerge gradually over several weeks.  Early warning signs are changes that people experience when a relapse is starting. Some early warning signs are common and others are not as common.   Common Early Warning Signs:    Feeling tense or nervous, Eating less or eating more, Trouble sleeping -either too much or too little sleep, Feeling depressed or low, Feeling irritable, Feeling like not being around other people, Trouble concentrating, Urges to harm self and Urges to use drugs or alcohol                 2. Identify action steps to take when warning signs are noticed:     Taking Action- It is important to take action if you are experiencing early warning signs of a relapse.  The faster you act, the more likely it is that you can avoid a full relapse.  It is helpful to identify several specific ways to cope with symptoms.       The following is my list of symptoms and coping strategies that I can use when they are present:     Symptom Coping Strategies   Anxiety -Talk with someone in your support system and let him or her know how you are feeling.  -Use relaxation techniques such as deep breathing or imagery.  -Use positive affirmations to counteract negative self-talk such as  I am  learning to let go of worry.    Depression - Schedule your day; include activities you have to do and activities you enjoy doing.  - Get some exercise - walk, run, bike, or swim.  - Give yourself credit for even the smallest things you get done.   Sleep Difficulties    - Go to sleep at the same time every day.  - Do something relaxing before bed, such as drinking herbal tea or listening to music.  - Avoid having discussions about upsetting topics before going to bed.   Delusions    - Distract yourself from the disturbing thought by doing something that requires your attention such as a puzzle.  - Check out your beliefs by talking to someone you trust.    Hallucinations    - Use headphones to listen to music.  - Tell voices to  stop  or say to yourself,  I am safe.   - Ignore the hallucinations as much as possible; focus on other things.   Concentration Difficulties - Minimize distractions so there is only one thing for you to focus on at a time.    - Ask the person you are having a conversation with to slow down or repeat things you are unsure of.                                        Professionals or agencies I can contact during a crisis:     Suicide Prevention Lifeline: 0-737-447-TALK (2554)  Crisis Text Line Service (available 24 hours a day, 7 days a week): Text [place] to 943518  Call  **CRISIS (265117) from a cell phone to talk to a team of professionals who can help you.          Crisis Services By [facility]: Phone Number:   [place]     [phone]   [place]    [phone]   [facility]    [phone]   [place]    [phone]   [place]    [phone]   [place] [phone]   [place]     [phone]      Call 911 or go to my nearest emergency department.

## 2022-07-03 ENCOUNTER — HEALTH MAINTENANCE LETTER (OUTPATIENT)
Age: 22
End: 2022-07-03

## 2023-01-14 ENCOUNTER — HEALTH MAINTENANCE LETTER (OUTPATIENT)
Age: 23
End: 2023-01-14

## 2023-07-16 ENCOUNTER — HEALTH MAINTENANCE LETTER (OUTPATIENT)
Age: 23
End: 2023-07-16

## 2024-09-08 ENCOUNTER — HEALTH MAINTENANCE LETTER (OUTPATIENT)
Age: 24
End: 2024-09-08